# Patient Record
Sex: FEMALE | Race: WHITE | NOT HISPANIC OR LATINO | Employment: OTHER | URBAN - METROPOLITAN AREA
[De-identification: names, ages, dates, MRNs, and addresses within clinical notes are randomized per-mention and may not be internally consistent; named-entity substitution may affect disease eponyms.]

---

## 2024-07-25 NOTE — PROGRESS NOTES
Subjective   Patient ID: Tanya Macedo is a 89 y.o. female who presents for Hearing Loss.  HPI  This 89-year-old female is being seen in the office today accompanied by her daughter for hearing evaluation.  According to history provided she has difficulties in crowded rooms.  She at times has missed cues on conversation.  She does better if people are speaking to her with appropriate volume.  She does have hearing aids which she has obtained through [employer] at least 2 years ago.  She has not been back from them to be serviced in the recent past.  There has been no vertigo, tinnitus or pain.  No old hearing tests are available to review.  She had no active respiratory problems.  Swallowing and voice have been stable.  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    Blister of left leg 05/03/2023    Cardiomegaly 05/30/2023    Cellulitis of left lower extremity 07/18/2023    Chronic deep vein thrombosis (DVT) of popliteal vein of right lower extremity (Multi) 06/01/2020    Chronic diastolic congestive heart failure (Multi) 11/14/2022    COVID-19 virus infection 01/28/2021    Debility 01/28/2021    Dependent edema 04/10/2021    Diverticulosis 04/09/2021    Edema of both lower extremities 01/28/2021    Essential hypertension 10/23/2019    History of depression 04/10/2021    History of DVT (deep vein thrombosis) 04/09/2021    History of endometrial cancer 04/09/2021    Hyperlipidemia 04/09/2021    Impaired glucose tolerance 04/10/2021    Atrophic vaginitis 04/09/2021    Irritable bowel syndrome 04/10/2021    Late onset Alzheimer's  dementia without behavioral disturbance (Multi) 07/30/2021    Malnutrition of mild degree (Multi) 01/26/2021    Mitral valve regurgitation 04/09/2021    Recurrent major depressive disorder, in remission ([facility]) 01/28/2021    UTI (urinary tract infection) 01/25/2021    Macular degeneration (senile) of retina 10/23/2019    Viral URI with cough 06/05/2023     Resolved Ambulatory Problems     Diagnosis Date Noted    No Resolved Ambulatory Problems     No Additional Past Medical History         Current Outpatient Medications:     omega-3 fatty acids-fish oil (Fish OiL) 360-1,200 mg capsule, Take 1 capsule (1,200 mg) by mouth once daily., Disp: , Rfl:     Uro--10-40.8-36 mg capsule, TAKE ONE CAPSULE BY MOUTH IN THE EVENING, Disp: , Rfl:     betamethasone dipropionate (Diprosone) 0.05 % ointment, Apply topically., Disp: , Rfl:     citalopram (CeleXA) 10 mg tablet, Take 1 tablet (10 mg) by mouth once daily., Disp: , Rfl:     DOCOSAHEXAENOIC ACID ORAL, Take 10 mg by mouth once daily., Disp: , Rfl:     docusate sodium (Colace) 100 mg capsule, Take 1 capsule (100 mg) by mouth once daily., Disp: , Rfl:     Eliquis 2.5 mg tablet, Take 1 tablet (2.5 mg) by mouth 2 times a day., Disp: , Rfl:     hydrALAZINE (Apresoline) 50 mg tablet, Take 1 tablet (50 mg) by mouth 2 times a day., Disp: , Rfl:     ipratropium (Atrovent) 42 mcg (0.06 %) nasal spray, Administer 1 spray into each nostril once daily., Disp: , Rfl:     loratadine (Claritin) 10 mg tablet, Take 1 tablet (10 mg) by mouth once daily., Disp: , Rfl:     metoprolol succinate XL (Toprol-XL) 50 mg 24 hr tablet, Take 1 tablet (50 mg) by mouth once daily in the morning. Take before meals., Disp: , Rfl:     omeprazole (PriLOSEC) 40 mg DR capsule, Take 1 capsule (40 mg) by mouth once daily., Disp: , Rfl:     potassium chloride ER (Micro-K) 10 mEq ER capsule, Take 1 capsule (10 mEq) by mouth once daily., Disp: , Rfl:     rosuvastatin (Crestor) 10 mg tablet, Take 1 tablet (10  "mg) by mouth once daily., Disp: , Rfl:     spironolactone (Aldactone) 25 mg tablet, Take 0.5 tablets (12.5 mg) by mouth once daily., Disp: , Rfl:     torsemide (Demadex) 20 mg tablet, Take 1 tablet (20 mg) by mouth once daily., Disp: , Rfl:     Viactiv 650 mg-12.5 mcg-40 mcg chewable tablet, Chew 1 tablet once daily., Disp: , Rfl:    Social History     Tobacco Use    Smoking status: Never    Smokeless tobacco: Never   Substance Use Topics    Alcohol use: Not Currently       History reviewed. No pertinent surgical history.     Allergies   Allergen Reactions    Ace Inhibitors Hives and Swelling    Amoxicillin-Pot Clavulanate Diarrhea    Ciprofloxacin Hives    Dye Hives    Gadolinium-Containing Contrast Media Hives    Sulfa (Sulfonamide Antibiotics) Hives    Nitrofurantoin Unknown    Benzoin Other     Patient doesn't know.    Chlorpheniramine-Pseudoephed Other     Yeast infections       Height 1.651 m (5' 5\"), weight 69.4 kg (153 lb).    Objective   Physical Exam  Examination:    CONSTITUTIONAL: Alert, in no acute distress, normal pitch/clarity of voice, well-developed, well-nourished, cooperative.  HEAD/FACE: Normocephalic, atraumatic, no tenderness over the sinuses, facial strength and movement symmetric.    SKIN: Good turgor, no rashes, no suspicious lesions, in the head and neck.    EYES: Both eyes have normal extraocular movements with no nystagmus, pupils are equal and reactive to light and accommodation, conjunctiva is clear.    EARS: Both ears are negative for external skin abnormalities, external auditory canals are without lesions or signs of inflammation, partial obstruction from cerumen is noted and is removed microscopically, tympanic membranes are intact and are of normal color and texture, no effusions are seen, light reflexes normal, no mastoid tenderness is noted to palpation, objective hearing is intact.    NOSE: No external skin lesions are noted, nares are patent, septum is intact and noninflamed, " nasal turbinates are normal in appearance, sinuses are nontender to palpation bilaterally, no internal lesions or polyps are noted, no discharge is noted.    OROPHARYNX/ORAL CAVITY: Mucous membranes of the oropharynx and the oral cavity proper are without lesions or ulcerations, tongue mobility is normal and no lesions are noted, gingiva and alveolar mucosa is intact without lesions, oral mucosa is moist, muscular movement of the palate and gag reflex are normal.    NECK: No lymphadenopathy is palpated, neck is supple with full range of motion, thyroid is without swelling or tenderness, trachea is midline, no neck masses are noted.    Lymphatics: No cervical adenopathy or supraclavicular adenopathy noted to palpation.    HEART/VASCULAR: No jugular venous distention is noted, carotid pulsations are intact with a regular rate and rhythm noted,    PULMONARY: Good air movement with normal inspiratory/expiratory effort is noted, no audible wheezing is appreciated.    NEUROLOGIC: Alert and oriented, cranial nerves are grossly intact except for hearing, gait is supported with a walker and without falling, sensation in the head and neck is intact,    PSYCH: oriented to person, place and time, normal mood and affect.    EXTREMITIES: No motor dysfunction of the upper and lower extremity is noted.    Patient ID: Tanya Macedo is a 89 y.o. female.    Ear cerumen removal    Date/Time: 7/29/2024 3:28 PM    Performed by: Hilario Connolly DMD, MD  Authorized by: Hilario Connolly DMD, MD    Consent:     Consent obtained:  Verbal    Consent given by:  Patient    Risks discussed:  Pain and incomplete removal    Alternatives discussed:  No treatment and alternative treatment  Universal protocol:     Procedure explained and questions answered to patient or proxy's satisfaction: yes      Imaging studies available: no      Required blood products, implants, devices, and special equipment available: no      Patient identity confirmed:   Verbally with patient  Procedure details:     Location:  L ear and R ear    Procedure type: curette      Procedure type comment:  Or suction    Procedure outcomes: cerumen removed    Post-procedure details:     Inspection:  No bleeding and ear canal clear    Hearing quality:  Improved    Procedure completion:  Tolerated well, no immediate complications  Comments:      CERUMEN REMOVAL    Consent:   the planned procedure is discussed including possible risks, benefits and alternative treatments reviewed. Verbal consent is obtained.    Indications:  obstructive cerumen is noted affecting hearing and causing discomfort.    Procedure: The ears are examined microscopically and obstructive cerumen is removed with a wax loop, suction, and forceps.    Findings: Cerumen and epithelial debris obstructs both canals. Tympanic membranes are intact and noninflamed once visualized and no infections are noted.    Post procedure: The patient tolerated the procedure well without complications.      Audiogram today revealed evidence for a moderate sensorineural hearing loss up through 3000 Hz then a moderately severe to severe currently hearing loss is noted.  Discrimination score 76% on the right at 90 dB 92% on the left at 90 dB.  Tympanograms are normal.  Assessment/Plan   Problem List Items Addressed This Visit             ICD-10-CM    Bilateral impacted cerumen H61.23     Other Visit Diagnoses         Codes    Sensorineural hearing loss (SNHL) of both ears    -  Primary H90.3    Auditory discrimination impairment, right     H93.291          I discussed the audiogram findings with the patient. Sensorineural hearing loss is noted and it is recommended to avoid loud noise without ear protection, avoid excessive dietary salt, and caffeine especially if tinnitus is noted. A yearly follow-up is advised but they should contact the office if sudden hearing loss develops, tinnitus increases, or if acute vertigo develops.  Unfortunately her  present hearing aids are not working and the patient is informed of that.  She needs it either return to [facility] for them to be evaluated and repaired but they seem to wish to start from a new and as such seeing our audiologist for hearing aid evaluation was discussed and will be ordered.  She is to keep water out of ears and avoid Q-tip use.  Since the hearing is not working she should keep them out until she is seen by audiologist here or at [facility].  Yearly recheck of hearing with an audiogram is recommended.       Hilario Connolly DMD, MD 07/29/24 3:26 PM

## 2024-07-29 ENCOUNTER — APPOINTMENT (OUTPATIENT)
Dept: OTOLARYNGOLOGY | Facility: CLINIC | Age: 89
End: 2024-07-29
Payer: MEDICARE

## 2024-07-29 ENCOUNTER — APPOINTMENT (OUTPATIENT)
Dept: AUDIOLOGY | Facility: CLINIC | Age: 89
End: 2024-07-29
Payer: MEDICARE

## 2024-07-29 VITALS — HEIGHT: 65 IN | WEIGHT: 153 LBS | BODY MASS INDEX: 25.49 KG/M2

## 2024-07-29 DIAGNOSIS — H90.3 SENSORINEURAL HEARING LOSS (SNHL) OF BOTH EARS: Primary | ICD-10-CM

## 2024-07-29 DIAGNOSIS — H93.291 AUDITORY DISCRIMINATION IMPAIRMENT, RIGHT: ICD-10-CM

## 2024-07-29 DIAGNOSIS — H61.23 BILATERAL IMPACTED CERUMEN: ICD-10-CM

## 2024-07-29 PROBLEM — E44.1 MALNUTRITION OF MILD DEGREE (MULTI): Status: ACTIVE | Noted: 2021-01-26

## 2024-07-29 PROBLEM — E78.5 HYPERLIPIDEMIA: Status: ACTIVE | Noted: 2021-04-09

## 2024-07-29 PROBLEM — L03.116 CELLULITIS OF LEFT LOWER EXTREMITY: Status: ACTIVE | Noted: 2023-07-18

## 2024-07-29 PROBLEM — S80.822A BLISTER OF LEFT LEG: Status: ACTIVE | Noted: 2023-05-03

## 2024-07-29 PROBLEM — Z85.42 HISTORY OF ENDOMETRIAL CANCER: Status: ACTIVE | Noted: 2021-04-09

## 2024-07-29 PROBLEM — I50.32 CHRONIC DIASTOLIC CONGESTIVE HEART FAILURE (MULTI): Status: ACTIVE | Noted: 2022-11-14

## 2024-07-29 PROBLEM — K58.9 IRRITABLE BOWEL SYNDROME: Status: ACTIVE | Noted: 2021-04-10

## 2024-07-29 PROBLEM — R05.1 ACUTE COUGH: Status: ACTIVE | Noted: 2023-05-22

## 2024-07-29 PROBLEM — Z86.59 HISTORY OF DEPRESSION: Status: ACTIVE | Noted: 2021-04-10

## 2024-07-29 PROBLEM — H35.30 MACULAR DEGENERATION (SENILE) OF RETINA: Chronic | Status: ACTIVE | Noted: 2019-10-23

## 2024-07-29 PROBLEM — D64.9 ANEMIA: Status: ACTIVE | Noted: 2022-11-14

## 2024-07-29 PROBLEM — I10 ESSENTIAL HYPERTENSION: Chronic | Status: ACTIVE | Noted: 2019-10-23

## 2024-07-29 PROBLEM — N95.2 ATROPHIC VAGINITIS: Status: ACTIVE | Noted: 2021-04-09

## 2024-07-29 PROBLEM — G30.1 LATE ONSET ALZHEIMER'S DEMENTIA WITHOUT BEHAVIORAL DISTURBANCE (MULTI): Status: ACTIVE | Noted: 2021-07-30

## 2024-07-29 PROBLEM — R60.0 EDEMA OF BOTH LOWER EXTREMITIES: Status: ACTIVE | Noted: 2021-01-28

## 2024-07-29 PROBLEM — R60.9 DEPENDENT EDEMA: Status: ACTIVE | Noted: 2021-04-10

## 2024-07-29 PROBLEM — I82.401 ACUTE DEEP VEIN THROMBOSIS (DVT) OF RIGHT LOWER EXTREMITY (MULTI): Status: ACTIVE | Noted: 2019-10-23

## 2024-07-29 PROBLEM — H54.40 BLIND LEFT EYE: Status: ACTIVE | Noted: 2021-04-10

## 2024-07-29 PROBLEM — F02.80 LATE ONSET ALZHEIMER'S DEMENTIA WITHOUT BEHAVIORAL DISTURBANCE (MULTI): Status: ACTIVE | Noted: 2021-07-30

## 2024-07-29 PROBLEM — N39.0 UTI (URINARY TRACT INFECTION): Status: ACTIVE | Noted: 2021-01-25

## 2024-07-29 PROBLEM — K57.90 DIVERTICULOSIS: Status: ACTIVE | Noted: 2021-04-09

## 2024-07-29 PROBLEM — I51.7 CARDIOMEGALY: Status: ACTIVE | Noted: 2023-05-30

## 2024-07-29 PROBLEM — Z86.718 HISTORY OF DVT (DEEP VEIN THROMBOSIS): Status: ACTIVE | Noted: 2021-04-09

## 2024-07-29 PROBLEM — I34.0 MITRAL VALVE REGURGITATION: Status: ACTIVE | Noted: 2021-04-09

## 2024-07-29 PROBLEM — U07.1 COVID-19 VIRUS INFECTION: Status: ACTIVE | Noted: 2021-01-28

## 2024-07-29 PROBLEM — J06.9 VIRAL URI WITH COUGH: Status: ACTIVE | Noted: 2023-06-05

## 2024-07-29 PROBLEM — R53.81 DEBILITY: Status: ACTIVE | Noted: 2021-01-28

## 2024-07-29 PROBLEM — R73.02 IMPAIRED GLUCOSE TOLERANCE: Status: ACTIVE | Noted: 2021-04-10

## 2024-07-29 PROBLEM — I82.531 CHRONIC DEEP VEIN THROMBOSIS (DVT) OF POPLITEAL VEIN OF RIGHT LOWER EXTREMITY (MULTI): Status: ACTIVE | Noted: 2020-06-01

## 2024-07-29 PROBLEM — F33.40 RECURRENT MAJOR DEPRESSIVE DISORDER, IN REMISSION (CMS-HCC): Status: ACTIVE | Noted: 2021-01-28

## 2024-07-29 PROCEDURE — 92567 TYMPANOMETRY: CPT | Performed by: AUDIOLOGIST

## 2024-07-29 PROCEDURE — 69210 REMOVE IMPACTED EAR WAX UNI: CPT | Performed by: OTOLARYNGOLOGY

## 2024-07-29 PROCEDURE — 99204 OFFICE O/P NEW MOD 45 MIN: CPT | Performed by: OTOLARYNGOLOGY

## 2024-07-29 PROCEDURE — 1036F TOBACCO NON-USER: CPT | Performed by: OTOLARYNGOLOGY

## 2024-07-29 PROCEDURE — 1160F RVW MEDS BY RX/DR IN RCRD: CPT | Performed by: OTOLARYNGOLOGY

## 2024-07-29 PROCEDURE — 92557 COMPREHENSIVE HEARING TEST: CPT | Performed by: AUDIOLOGIST

## 2024-07-29 PROCEDURE — 1159F MED LIST DOCD IN RCRD: CPT | Performed by: OTOLARYNGOLOGY

## 2024-07-29 RX ORDER — OMEPRAZOLE 40 MG/1
40 CAPSULE, DELAYED RELEASE ORAL DAILY
COMMUNITY

## 2024-07-29 RX ORDER — LORATADINE 10 MG/1
10 TABLET ORAL DAILY
COMMUNITY

## 2024-07-29 RX ORDER — METHENAMINE, SODIUM PHOSPHATE, MONOBASIC, ANHYDROUS, PHENYL SALICYLATE, METHYLENE BLUE AND HYOSCYAMINE SULFATE 118; 40.8; 36; 10; .12 MG/1; MG/1; MG/1; MG/1; MG/1
CAPSULE ORAL
COMMUNITY
Start: 2024-07-15

## 2024-07-29 RX ORDER — CITALOPRAM 10 MG/1
10 TABLET ORAL DAILY
COMMUNITY

## 2024-07-29 RX ORDER — APIXABAN 2.5 MG/1
2.5 TABLET, FILM COATED ORAL 2 TIMES DAILY
COMMUNITY

## 2024-07-29 RX ORDER — BETAMETHASONE DIPROPIONATE 0.5 MG/G
OINTMENT TOPICAL
COMMUNITY

## 2024-07-29 RX ORDER — ROSUVASTATIN CALCIUM 10 MG/1
10 TABLET, COATED ORAL DAILY
COMMUNITY

## 2024-07-29 RX ORDER — TORSEMIDE 20 MG/1
20 TABLET ORAL DAILY
COMMUNITY

## 2024-07-29 RX ORDER — IPRATROPIUM BROMIDE 42 UG/1
1 SPRAY, METERED NASAL DAILY
COMMUNITY

## 2024-07-29 RX ORDER — CALCIUM CARB/VITAMIN D3/VIT K1 650MG-12.5
1 TABLET,CHEWABLE ORAL DAILY
COMMUNITY

## 2024-07-29 RX ORDER — HYDRALAZINE HYDROCHLORIDE 50 MG/1
50 TABLET, FILM COATED ORAL 2 TIMES DAILY
COMMUNITY

## 2024-07-29 RX ORDER — METOPROLOL SUCCINATE 50 MG/1
50 TABLET, EXTENDED RELEASE ORAL
COMMUNITY

## 2024-07-29 RX ORDER — DOCUSATE SODIUM 100 MG/1
100 CAPSULE, LIQUID FILLED ORAL DAILY
COMMUNITY

## 2024-07-29 RX ORDER — VIT C/E/ZN/COPPR/LUTEIN/ZEAXAN 250MG-90MG
1 CAPSULE ORAL DAILY
COMMUNITY
Start: 2023-08-28

## 2024-07-29 RX ORDER — SPIRONOLACTONE 25 MG/1
12.5 TABLET ORAL DAILY
COMMUNITY

## 2024-07-29 RX ORDER — POTASSIUM CHLORIDE 750 MG/1
10 CAPSULE, EXTENDED RELEASE ORAL DAILY
COMMUNITY

## 2024-07-29 NOTE — PROGRESS NOTES
COMPREHENSIVE AUDIOMETRIC EVALUATION      Name:  Tanya Macedo  :  1935  Age:  89 y.o.  Date of Evaluation:  24   Referring Provider:  Dr. Connolly     History:  Ms. Macedo was seen today for an evaluation of hearing accompanied by daughter.  Patient reported known hearing loss for which she utilizes hearing aids she had obtained from [employer].  Patient noted that her hearing aids do not seem to be as helpful as what she would like them to be, though has additional concerns for decreased hearing.  When asked, patient denied otalgia and tinnitus    See audiometric evaluation at end of this report or scanned under media tab    OTOSCOPY:       Right Ear: Significant though non-occluding cerumen       Left Ear: Significant though non-occluding cerumen    226 Hz TYMPANOMETRY:       Right Ear: Type A: normal peak pressure, compliance, and ear canal volume, consistent with middle ear function within normal limits       Left Ear: Type A: normal peak pressure, compliance, and ear canal volume, consistent with middle ear function within normal limits    AUDIOMETRIC EVALUATION (Phones):       Right Ear: Moderate sloping to Profound Sensorineural hearing loss                 Left Ear: Moderate sloping to Profound Sensorineural hearing loss           Test technique:  Standard Audiometry  Reliability:   good    SPEECH RECOGNITION THRESHOLD:       Right Ear:  55 dBHL in good agreement with PTA       Left Ear:  55 dBHL in good agreement with PTA    WORD RECOGNITION:       Right Ear:  fair (76%) at elevated presentation level       Left Ear:  excellent (92%) at elevated presentation level    ACOUSTIC REFLEXES: Attempted though could not maintain consistent seal    DISCUSSION:   Discussed results and recommendations with patient and daughter.  Questions were addressed and the patient was encouraged to contact our department should concerns arise.    RECOMMENDATIONS:  -Recommend patient return should concerns for changes in  hearing sensitivity arise or as medically indicated.  -Recommend patient return for hearing aid evaluation     NOTE: At request of Dr. Connolly, looked at patient's hearing aids. Visual inspection revealed both receivers were broken and listening check revealed non working function of devices. Discussed with patient and daughter and recommended they return to [facility] for  change, though they noted they would prefer to pursue new hearing aids through our clinic.    France Guzmán, CCC-A     Appt: 2:30 - 3:00 PM

## 2024-09-12 ENCOUNTER — APPOINTMENT (OUTPATIENT)
Dept: AUDIOLOGY | Facility: CLINIC | Age: 89
End: 2024-09-12

## 2024-09-12 DIAGNOSIS — H90.3 SENSORINEURAL HEARING LOSS (SNHL) OF BOTH EARS: Primary | ICD-10-CM

## 2024-09-12 PROCEDURE — HRANC PR HEARING AID NO CHARGE: Performed by: AUDIOLOGIST

## 2024-09-12 NOTE — PROGRESS NOTES
HEARING AID EVALUATION    Tanya Macedo was seen for hearing aid evaluation.  Reviewed patient's hearing evaluation.  Discussed different types/styles, levels of technology, pricing, and warranties of hearing aids. Discussed patient's insurance that that they do not have hearing aid benefits through our office, though have benefits through [facility]. Patient would like to move forward with two Phonak P70 in the color P5 with small closed domes and 2 x M receivers.  Patient satisfied.    N/c    France Guzmán, CCC-A    Appt time: 3:00 - 3:45

## 2024-10-01 ENCOUNTER — DOCUMENTATION (OUTPATIENT)
Dept: AUDIOLOGY | Facility: CLINIC | Age: 89
End: 2024-10-01
Payer: MEDICARE

## 2024-10-01 NOTE — PROGRESS NOTES
HEARING AID CHECK IN    RIGHT: Phonak Audeo P90-RT SN: [id number]  : 2 x M  Wax Guard/Dome: Cerushield, small closed  LEFT: Phonak Audeo P90-RT SN: [id number]  : 2 x M  Wax Guard/Dome: Cerushield, small closed    Repair Warranty: 10/16/2027  L&D Warranty: 10/16/2027  HA Office Visits: 12/31/2024    Tanya Macedo's device/s were received from .  Devices charged, preprogrammed, paperwork completed and devices placed in hearing aid lab in cabinet above sink. Patient's hearing aid fitting is scheduled for 10/2/2024.    France Guzmán, CCC-A

## 2024-10-02 ENCOUNTER — APPOINTMENT (OUTPATIENT)
Dept: AUDIOLOGY | Facility: CLINIC | Age: 89
End: 2024-10-02

## 2024-10-07 ENCOUNTER — APPOINTMENT (OUTPATIENT)
Dept: AUDIOLOGY | Facility: CLINIC | Age: 89
End: 2024-10-07

## 2024-10-09 ENCOUNTER — APPOINTMENT (OUTPATIENT)
Dept: AUDIOLOGY | Facility: CLINIC | Age: 89
End: 2024-10-09

## 2024-10-22 NOTE — PROGRESS NOTES
HEARING AID FITTING    RIGHT: Phonak Audeo P90-RT SN: [id number]  : 2 x M  Wax Guard/Dome: Cerushield, small closed  LEFT: Phonak Audeo P90-RT SN: [id number]  : 2 x M  Wax Guard/Dome: Cerushield, small closed     Repair Warranty: 10/16/2027  L&D Warranty: 10/16/2027  HA Office Visits: 1/23/2025    Tanya Macedo was seen for a hearing aid fitting accompanied by her daughter.  Otoscopy revealed Minimal non-occluding cerumen.  Discussed parts of devices, charging, and practiced insertion and removal. Patient had a significant amount of difficulty with  and putting devices in and out, however, she was able to do with encouragement and when no further help was provided.  Marked device and  with red marker due to visual impairment. Patient to return in approximately two weeks. Patient satisfied.    Patient brought to  to pay $2400 (: $300, : $2100)    RECOMMENDATIONS:  1) Recommend patient return in 2 for trial period check.    France Guzmán, CCC-A    Appt time: 11:30 AM - 12:30 PM

## 2024-10-25 ENCOUNTER — APPOINTMENT (OUTPATIENT)
Dept: AUDIOLOGY | Facility: CLINIC | Age: 89
End: 2024-10-25

## 2024-10-25 DIAGNOSIS — H90.3 SENSORINEURAL HEARING LOSS (SNHL) OF BOTH EARS: Primary | ICD-10-CM

## 2024-10-25 PROCEDURE — V5160 DISPENSING FEE BINAURAL: HCPCS | Performed by: AUDIOLOGIST

## 2024-10-25 PROCEDURE — V5261 HEARING AID, DIGIT, BIN, BTE: HCPCS | Performed by: AUDIOLOGIST

## 2024-11-05 NOTE — PROGRESS NOTES
"TRIAL PERIOD CHECK    RIGHT: Phonak Audeo P90-RT SN: [id number]  : 2 x M  Wax Guard/Dome: Cerushield, small closed  LEFT: Phonak Audeo P90-RT SN: [id number]  : 2 x M  Wax Guard/Dome: Cerushield, small closed     Repair Warranty: 10/16/2027  L&D Warranty: 10/16/2027  HA Office Visits: 1/23/2025    Tanya Macedo was seen for initial trial period check with family friend Rebekah.  Listening check revealed good working function of devices.  Patient reported doing very well with her devices in quiet, though continued difficulty in the dining room with her friend \"Pug\".  Increased high frequencies in the auto program, though additionally increased high frequencies even more in the speech in noise program.  Additionally made hearing aids more directional in the presence of background noise. Patient reported doing much better getting the devices in and out, which was confirmed with visual inspection of devices in ears. Gave patient brush and discussed brushing over the microphone ports. Patient satisfied.    N/c under warranty    RECOMMENDATIONS:  -Recommend patient return in ~2 weeks or sooner should concerns arise.    France Guzmán, CCC-A    Appt time: 3:00 - 3:25 PM  "

## 2024-11-15 ENCOUNTER — APPOINTMENT (OUTPATIENT)
Dept: AUDIOLOGY | Facility: CLINIC | Age: 89
End: 2024-11-15
Payer: MEDICARE

## 2024-11-15 DIAGNOSIS — H90.3 SENSORINEURAL HEARING LOSS (SNHL) OF BOTH EARS: Primary | ICD-10-CM

## 2024-11-15 PROCEDURE — HRANC PR HEARING AID NO CHARGE: Performed by: AUDIOLOGIST

## 2024-12-09 NOTE — PROGRESS NOTES
TRIAL PERIOD CHECK    RIGHT: Phonak Audeo P90-RT SN: [id number]  : 2 x M new  Wax Guard/Dome: Cerushield, small closed  LEFT: Phonak Audeo P90-RT SN: [id number]  : 2 x M new  Wax Guard/Dome: Cerushield, small closed     Repair Warranty: 10/16/2027  L&D Warranty: 10/16/2027  HA Office Visits: 1/23/2025    Tanya Macedo was seen for a trial period check accompanied by her caregiver.  Patient noted she is not having difficulty putting the hearing aids into her ears.  Listening check revealed poor working function of devices.  Hearing aids were cleaned and domes and wax guards changed. Listening check continued to reveal poor working function.  Hearing aids were not charged.  Plugged into  and turned on and listening check then revealed good working function of devices.  Discussed how to appropriately charge, how to place and remove, and how to check . Patient satisfied.    N/c under warranty    RECOMMENDATIONS:  -Recommend patient return in 1 month or sooner should concerns arise.    France Guzmán, CCC-A    Appt time: 2:30 - 3:00 PM

## 2024-12-19 ENCOUNTER — APPOINTMENT (OUTPATIENT)
Dept: AUDIOLOGY | Facility: CLINIC | Age: 89
End: 2024-12-19
Payer: MEDICARE

## 2024-12-19 DIAGNOSIS — H90.3 SENSORINEURAL HEARING LOSS (SNHL) OF BOTH EARS: Primary | ICD-10-CM

## 2024-12-19 PROCEDURE — HRANC PR HEARING AID NO CHARGE: Performed by: AUDIOLOGIST

## 2025-01-28 ENCOUNTER — APPOINTMENT (OUTPATIENT)
Dept: AUDIOLOGY | Facility: CLINIC | Age: OVER 89
End: 2025-01-28
Payer: MEDICARE

## 2025-07-30 ENCOUNTER — APPOINTMENT (OUTPATIENT)
Dept: AUDIOLOGY | Facility: CLINIC | Age: OVER 89
End: 2025-07-30
Payer: MEDICARE

## 2025-07-30 ENCOUNTER — APPOINTMENT (OUTPATIENT)
Dept: OTOLARYNGOLOGY | Facility: CLINIC | Age: OVER 89
End: 2025-07-30
Payer: MEDICARE